# Patient Record
Sex: MALE | Race: WHITE | Employment: OTHER | URBAN - METROPOLITAN AREA
[De-identification: names, ages, dates, MRNs, and addresses within clinical notes are randomized per-mention and may not be internally consistent; named-entity substitution may affect disease eponyms.]

---

## 2024-11-18 ENCOUNTER — APPOINTMENT (OUTPATIENT)
Dept: GENERAL RADIOLOGY | Facility: HOSPITAL | Age: 59
End: 2024-11-18
Attending: EMERGENCY MEDICINE
Payer: COMMERCIAL

## 2024-11-18 ENCOUNTER — APPOINTMENT (OUTPATIENT)
Dept: CV DIAGNOSTICS | Facility: HOSPITAL | Age: 59
End: 2024-11-18
Attending: INTERNAL MEDICINE
Payer: COMMERCIAL

## 2024-11-18 ENCOUNTER — HOSPITAL ENCOUNTER (OUTPATIENT)
Facility: HOSPITAL | Age: 59
Setting detail: OBSERVATION
Discharge: HOME | End: 2024-11-18
Attending: EMERGENCY MEDICINE | Admitting: HOSPITALIST
Payer: COMMERCIAL

## 2024-11-18 VITALS
HEIGHT: 74.8 IN | HEART RATE: 56 BPM | WEIGHT: 249.13 LBS | SYSTOLIC BLOOD PRESSURE: 132 MMHG | OXYGEN SATURATION: 97 % | RESPIRATION RATE: 15 BRPM | TEMPERATURE: 98 F | DIASTOLIC BLOOD PRESSURE: 93 MMHG | BODY MASS INDEX: 31.3 KG/M2

## 2024-11-18 DIAGNOSIS — R07.9 ACUTE CHEST PAIN: Primary | ICD-10-CM

## 2024-11-18 LAB
ALBUMIN SERPL-MCNC: 4.3 G/DL (ref 3.2–4.8)
ALBUMIN/GLOB SERPL: 1.7 {RATIO} (ref 1–2)
ALP LIVER SERPL-CCNC: 74 U/L
ALT SERPL-CCNC: 65 U/L
ANION GAP SERPL CALC-SCNC: 4 MMOL/L (ref 0–18)
AST SERPL-CCNC: 45 U/L (ref ?–34)
ATRIAL RATE: 59 BPM
ATRIAL RATE: 70 BPM
BASOPHILS # BLD AUTO: 0.03 X10(3) UL (ref 0–0.2)
BASOPHILS NFR BLD AUTO: 0.4 %
BILIRUB SERPL-MCNC: 0.5 MG/DL (ref 0.2–1.1)
BUN BLD-MCNC: 15 MG/DL (ref 9–23)
CALCIUM BLD-MCNC: 9.4 MG/DL (ref 8.7–10.4)
CHLORIDE SERPL-SCNC: 104 MMOL/L (ref 98–112)
CO2 SERPL-SCNC: 30 MMOL/L (ref 21–32)
CREAT BLD-MCNC: 1 MG/DL
D DIMER PPP FEU-MCNC: <0.27 UG/ML FEU (ref ?–0.6)
EGFRCR SERPLBLD CKD-EPI 2021: 86 ML/MIN/1.73M2 (ref 60–?)
EOSINOPHIL # BLD AUTO: 0.1 X10(3) UL (ref 0–0.7)
EOSINOPHIL NFR BLD AUTO: 1.4 %
ERYTHROCYTE [DISTWIDTH] IN BLOOD BY AUTOMATED COUNT: 12.1 %
GLOBULIN PLAS-MCNC: 2.6 G/DL (ref 2–3.5)
GLUCOSE BLD-MCNC: 147 MG/DL (ref 70–99)
HCT VFR BLD AUTO: 44.8 %
HGB BLD-MCNC: 15.4 G/DL
IMM GRANULOCYTES # BLD AUTO: 0.05 X10(3) UL (ref 0–1)
IMM GRANULOCYTES NFR BLD: 0.7 %
LYMPHOCYTES # BLD AUTO: 2.65 X10(3) UL (ref 1–4)
LYMPHOCYTES NFR BLD AUTO: 38 %
MCH RBC QN AUTO: 29.6 PG (ref 26–34)
MCHC RBC AUTO-ENTMCNC: 34.4 G/DL (ref 31–37)
MCV RBC AUTO: 86 FL
MONOCYTES # BLD AUTO: 0.6 X10(3) UL (ref 0.1–1)
MONOCYTES NFR BLD AUTO: 8.6 %
NEUTROPHILS # BLD AUTO: 3.55 X10 (3) UL (ref 1.5–7.7)
NEUTROPHILS # BLD AUTO: 3.55 X10(3) UL (ref 1.5–7.7)
NEUTROPHILS NFR BLD AUTO: 50.9 %
OSMOLALITY SERPL CALC.SUM OF ELEC: 290 MOSM/KG (ref 275–295)
P AXIS: 25 DEGREES
P AXIS: 30 DEGREES
P-R INTERVAL: 158 MS
P-R INTERVAL: 160 MS
PLATELET # BLD AUTO: 321 10(3)UL (ref 150–450)
POTASSIUM SERPL-SCNC: 4.3 MMOL/L (ref 3.5–5.1)
PROT SERPL-MCNC: 6.9 G/DL (ref 5.7–8.2)
Q-T INTERVAL: 428 MS
Q-T INTERVAL: 440 MS
QRS DURATION: 164 MS
QRS DURATION: 166 MS
QTC CALCULATION (BEZET): 435 MS
QTC CALCULATION (BEZET): 462 MS
R AXIS: 40 DEGREES
R AXIS: 49 DEGREES
RBC # BLD AUTO: 5.21 X10(6)UL
SODIUM SERPL-SCNC: 138 MMOL/L (ref 136–145)
T AXIS: 22 DEGREES
T AXIS: 27 DEGREES
TROPONIN I SERPL HS-MCNC: <3 NG/L
TROPONIN I SERPL HS-MCNC: <3 NG/L
VENTRICULAR RATE: 59 BPM
VENTRICULAR RATE: 70 BPM
WBC # BLD AUTO: 7 X10(3) UL (ref 4–11)

## 2024-11-18 PROCEDURE — 99235 HOSP IP/OBS SAME DATE MOD 70: CPT | Performed by: INTERNAL MEDICINE

## 2024-11-18 PROCEDURE — 93018 CV STRESS TEST I&R ONLY: CPT | Performed by: INTERNAL MEDICINE

## 2024-11-18 PROCEDURE — 93306 TTE W/DOPPLER COMPLETE: CPT | Performed by: INTERNAL MEDICINE

## 2024-11-18 PROCEDURE — 78452 HT MUSCLE IMAGE SPECT MULT: CPT | Performed by: INTERNAL MEDICINE

## 2024-11-18 PROCEDURE — 71045 X-RAY EXAM CHEST 1 VIEW: CPT | Performed by: EMERGENCY MEDICINE

## 2024-11-18 PROCEDURE — 93017 CV STRESS TEST TRACING ONLY: CPT | Performed by: INTERNAL MEDICINE

## 2024-11-18 RX ORDER — LOSARTAN POTASSIUM 100 MG/1
100 TABLET ORAL DAILY
Status: DISCONTINUED | OUTPATIENT
Start: 2024-11-19 | End: 2024-11-18

## 2024-11-18 RX ORDER — ONDANSETRON 2 MG/ML
4 INJECTION INTRAMUSCULAR; INTRAVENOUS EVERY 6 HOURS PRN
Status: DISCONTINUED | OUTPATIENT
Start: 2024-11-18 | End: 2024-11-18

## 2024-11-18 RX ORDER — SENNOSIDES 8.6 MG
17.2 TABLET ORAL NIGHTLY PRN
Status: DISCONTINUED | OUTPATIENT
Start: 2024-11-18 | End: 2024-11-18

## 2024-11-18 RX ORDER — HYDROCHLOROTHIAZIDE 12.5 MG/1
12.5 TABLET ORAL DAILY
Status: DISCONTINUED | OUTPATIENT
Start: 2024-11-19 | End: 2024-11-18

## 2024-11-18 RX ORDER — SODIUM PHOSPHATE, DIBASIC AND SODIUM PHOSPHATE, MONOBASIC 7; 19 G/230ML; G/230ML
1 ENEMA RECTAL ONCE AS NEEDED
Status: DISCONTINUED | OUTPATIENT
Start: 2024-11-18 | End: 2024-11-18

## 2024-11-18 RX ORDER — ASPIRIN 81 MG/1
81 TABLET, CHEWABLE ORAL DAILY
Status: DISCONTINUED | OUTPATIENT
Start: 2024-11-18 | End: 2024-11-18

## 2024-11-18 RX ORDER — ECHINACEA PURPUREA EXTRACT 125 MG
1 TABLET ORAL
Status: DISCONTINUED | OUTPATIENT
Start: 2024-11-18 | End: 2024-11-18

## 2024-11-18 RX ORDER — POLYETHYLENE GLYCOL 3350 17 G/17G
17 POWDER, FOR SOLUTION ORAL DAILY PRN
Status: DISCONTINUED | OUTPATIENT
Start: 2024-11-18 | End: 2024-11-18

## 2024-11-18 RX ORDER — ENOXAPARIN SODIUM 100 MG/ML
40 INJECTION SUBCUTANEOUS NIGHTLY
Status: DISCONTINUED | OUTPATIENT
Start: 2024-11-18 | End: 2024-11-18

## 2024-11-18 RX ORDER — PROCHLORPERAZINE EDISYLATE 5 MG/ML
5 INJECTION INTRAMUSCULAR; INTRAVENOUS EVERY 8 HOURS PRN
Status: DISCONTINUED | OUTPATIENT
Start: 2024-11-18 | End: 2024-11-18

## 2024-11-18 RX ORDER — BISACODYL 10 MG
10 SUPPOSITORY, RECTAL RECTAL
Status: DISCONTINUED | OUTPATIENT
Start: 2024-11-18 | End: 2024-11-18

## 2024-11-18 RX ORDER — ACETAMINOPHEN 500 MG
500 TABLET ORAL EVERY 4 HOURS PRN
Status: DISCONTINUED | OUTPATIENT
Start: 2024-11-18 | End: 2024-11-18

## 2024-11-18 RX ORDER — BENZONATATE 200 MG/1
200 CAPSULE ORAL 3 TIMES DAILY PRN
Status: DISCONTINUED | OUTPATIENT
Start: 2024-11-18 | End: 2024-11-18

## 2024-11-18 RX ORDER — AMLODIPINE BESYLATE 5 MG/1
5 TABLET ORAL DAILY
Status: DISCONTINUED | OUTPATIENT
Start: 2024-11-19 | End: 2024-11-18

## 2024-11-18 NOTE — DISCHARGE SUMMARY
[place] HOSPITALIST  DISCHARGE SUMMARY     Dedrick Willis Patient Status:  Observation    10/29/1965 MRN [id number]   Location [facility] 0SW-A Attending Doron Porras,    Hosp Day # 0 PCP Unknown Pcp     Date of Admission: 2024  Date of Discharge: 2024  Discharge Disposition: Home or Self Care    Discharge Diagnosis:   #Chest pain, resolved  #Hypertension  #Elevated LFTs, mild    History of Present Illness: Dedrick Willis is a 59 year old male with PMHx hypertension who presents to the hospital with chest pain and dizziness that started at 5AM. It was sharp and substernal. He felt diaphoretic and dizzy. No nausea, vomiting or SOB. He states it radiated down both his arms and legs as well as neck. He describes it as a burning sensation. It resolved after 5-10 minutes. In ER, EKG showed RBBB. He had another episode of pain in the ER lasting 2-3 minutes, EKG without change. Troponin x 2 and D-dimer negative. Cardiology consulted and plan for echo and stress test. He denies family hx of heart disease. He is a never smoker and drinks ETOH socially.     Brief Synopsis: Troponin and D-dimer were negative.  Echo showed preserved EF, no significant valvular abnormalities and normal diastolic function parameters.  Stress test negative for ischemia.  He was discharged with impression.       59-90 High Risk  29-58 Medium Risk  0-28   Low Risk  Patient was referred to the [facility].    TCM Follow-Up Recommendation:  LACE < 29: Low Risk of readmission after discharge from the hospital; Still recommend for TCM follow-up.    Procedures during hospitalization:   Nuclear stress test    Incidental or significant findings and recommendations (brief descriptions):  Elevated LFTs, recommend outpatient PCP follow-up or at [facility]    Lab/Test results pending at Discharge:   None     Consultants:  Cardiology    Discharge Medication List:     Discharge Medications        CONTINUE taking these  medications        Instructions Prescription details   UNKNOWN TO PATIENT - BLOOD PRESSURE       Refills: 0     UNKNOWN TO PATIENT - BLOOD THINNER       Refills: 0            STOP taking these medications      acetaminophen 325 MG Tabs  Commonly known as: Tylenol                 [place] substances prescribed at discharge.    Follow-up appointment:   [facility]  [address]  [facility] [address] [phone]  Go to      PCP    Follow up in 1 week(s)      Deondre Stringer MD  [address] [address] [phone]    Follow up  our office will call you for an appointment    Appointments for Next 30 Days 11/18/2024 - 12/18/2024      None            Vital signs:  Temp:  [97.6 °F (36.4 °C)] 97.6 °F (36.4 °C)  Pulse:  [56-79] 56  Resp:  [14-18] 15  BP: (131-160)/(87-94) 132/93  SpO2:  [95 %-100 %] 97 %    Physical Exam:    See H&P same day.  -----------------------------------------------------------------------------------------------  PATIENT DISCHARGE INSTRUCTIONS: See electronic chart    Doron Porras DO    Time spent:  15 minutes

## 2024-11-18 NOTE — CONSULTS
Cardiology Consultation    Dedrick Willis Patient Status:  Emergency    10/29/1965 MRN [id number]   [facility] EMERGENCY DEPARTMENT Attending Anibal Tellez MD   Hosp Day # 0 PCP Unknown Pcp     Reason for Consultation:  chest pain, dizziness      History of Present Illness:  Dedrick Willis is a a(n) 59 year old male. Very nice magdalena, here with his wife, German speaking.  Daughter on the phone translates.  He has HTN. .  He woke this morning feeling \"hung over.\"  He then experienced 5 minutes of chest pain with dizziness.  Sx's resolved on their own.  911 called.    /90 on arrival.  P 60's, on RA.  EKG with RBBB, NPT.  Had further cp in the ER, EKG w/o change.  Troponin negative.      History:  Past Medical History:    Essential hypertension     Past Surgical History:   Procedure Laterality Date    Appendectomy      Other  surgery, left ear    Repair ing hernia,5+y/o,reducibl       No family history on file.      Allergies:  Allergies[1]    Medications:      Continuous Infusions:      Social History:   reports that he has never smoked. He does not have any smokeless tobacco history on file. He reports that he does not currently use alcohol. He reports that he does not use drugs.    Review of Systems:  All systems were reviewed and are negative except as described above in HPI.    Physical Exam:      Temp:  [97.6 °F (36.4 °C)] 97.6 °F (36.4 °C)  Pulse:  [61-79] 63  Resp:  [14-18] 15  BP: (131-160)/(87-94) 159/94  SpO2:  [95 %-100 %] 98 %    Last 3 Weights   24 0541 250 lb (113.4 kg)   14 0909 195 lb (88.5 kg)           General: No apparent distress  HEENT: No focal deficits.  Neck: supple. JVP normal  Cardiac: Regular rhythm, S1, S2 normal,   No rub or gallop.  No murmur.  Lungs: CTA  Abdomen: Soft, non-tender.   Extremities: No edema  Neurologic: no focal deficits  Skin: Warm and dry.          Telemetry: sinus    Laboratories and Data:  Diagnostics:    EKG,  11/18/2024:  RBBB    CXR, 11/18/2024:  normal medistinum    Labs:   HEM:  Recent Labs   Lab 11/18/24  0545   WBC 7.0   HGB 15.4   .0       Chem:  Recent Labs   Lab 11/18/24  0545      K 4.3      CO2 30.0   BUN 15   CREATSERUM 1.00   CA 9.4   *       Recent Labs   Lab 11/18/24  0545   ALT 65*   AST 45*   ALB 4.3       No results for input(s): \"PTP\", \"INR\" in the last 168 hours.    No results for input(s): \"TROP\", \"CK\" in the last 168 hours.      Impression:   Chest pain with dizziness - unclear etiology at this time.  HTN  RBBB, age undetermined.    Plan:  2 hour troponin and d dimer.  Plan admit with nuclear stress and echocardiogram today.    Deondre Stringer MD  11/18/2024  8:49 AM  C5       [1] No Known Allergies

## 2024-11-18 NOTE — ED INITIAL ASSESSMENT (HPI)
Triage done with  Aureliano [id number]    Patient here via EMS with c/o pins and needle sensation in chest and dizziness.  Patient woke up with symptoms.  Patient given 324mg of aspirin.  Denies chest pain and dizziness at this time.

## 2024-11-18 NOTE — ED PROVIDER NOTES
Patient Seen in: [facility] Emergency Department      History     Chief Complaint   Patient presents with    Chest Pain Angina    Dizziness     Stated Complaint: Chest pain    Subjective:   HPI      59-year-old male presents reporting an episode of sharp pain in the midsternal region associated with dizziness and diaphoresis.  The episode lasted about 10 minutes and then resolved.  No similar episodes in the past.  Did not notice any significant shortness of breath or palpitations.  Reports a history of hypertension.  He also says he takes an unknown blood thinner.  Denies any significant cardiac history.    Objective:     Past Medical History:    Essential hypertension    High blood pressure    Visual impairment              Past Surgical History:   Procedure Laterality Date    Appendectomy      Other  surgery, left ear    Repair ing hernia,5+y/o,reducibl                  Social History     Socioeconomic History    Marital status:    Tobacco Use    Smoking status: Never   Vaping Use    Vaping status: Never Used   Substance and Sexual Activity    Alcohol use: Not Currently    Drug use: Never     Social Drivers of Health     Food Insecurity: No Food Insecurity (11/18/2024)    Food Insecurity     Food Insecurity: Never true   Transportation Needs: No Transportation Needs (11/18/2024)    Transportation Needs     Lack of Transportation: No   Housing Stability: Low Risk  (11/18/2024)    Housing Stability     Housing Instability: No                  Physical Exam     ED Triage Vitals [11/18/24 0541]   BP (!) 140/91   Pulse 77   Resp 14   Temp 97.6 °F (36.4 °C)   Temp src Temporal   SpO2 95 %   O2 Device None (Room air)       Current Vitals:   Vital Signs  BP: (!) 132/93  Pulse: 56  Resp: 15  Temp: 97.6 °F (36.4 °C)  Temp src: Temporal  MAP (mmHg): (!) 104    Oxygen Therapy  SpO2: 97 %  O2 Device: None (Room air)        Physical Exam  General:  Vitals as listed.  No acute distress   HEENT: Sclerae anicteric.   Conjunctivae show no pallor.  Oropharynx clear, mucous membranes moist   Neck: supple, no rigidity   Lungs: good air exchange and clear   Heart: regular rate rhythm and no murmur   Abdomen: Soft and nontender.  No abdominal masses.  No peritoneal signs   Extremities: no edema, normal peripheral pulses   Neuro: Alert oriented and nonfocal   Skin: no rashes or nodules    ED Course     Labs Reviewed   COMP METABOLIC PANEL (14) - Abnormal; Notable for the following components:       Result Value    Glucose 147 (*)     AST 45 (*)     ALT 65 (*)     All other components within normal limits   TROPONIN I HIGH SENSITIVITY - Normal   D-DIMER - Normal   TROPONIN I HIGH SENSITIVITY - Normal   CBC WITH DIFFERENTIAL WITH PLATELET   RAINBOW DRAW LAVENDER   RAINBOW DRAW LIGHT GREEN   RAINBOW DRAW BLUE     [id number]    Rate, intervals and axes as noted on EKG Report.  Rate: 59  Rhythm: Sinus Rhythm  Reading: No ST elevation MI.  No previous for comparison.    [id number]    Rate, intervals and axes as noted on EKG Report.  Rate: 70  Rhythm: Sinus Rhythm  Reading: No ST elevation MI                XR CHEST AP PORTABLE  (CPT=71045)    Result Date: 11/18/2024  PROCEDURE:  XR CHEST AP PORTABLE  (CPT=71045)  TECHNIQUE:  AP chest radiograph was obtained.  COMPARISON:  None.  INDICATIONS:  Chest pain  PATIENT STATED HISTORY: (As transcribed by Technologist)  Patient offered no additional history at this time.    FINDINGS:  The heart is borderline in size.  The lungs are clear of acute-appearing disease process.  The costophrenic angles are sharp.  There is no active disease seen on the basis of portable radiography.            CONCLUSION:  Borderline heart size. No active disease seen.   LOCATION:  [facility]      Dictated by (CST): Tushar Davis MD on 11/18/2024 at 6:43 AM     Finalized by (CST): Tushar Davis MD on 11/18/2024 at 6:43 AM          Heart Score:    HEART Score      Title      Criteria Score   Age: 45-64 Age Score: 1   History:  Slightly Suspicious Hx Score: 0     EKG: Non-Spec Changes EKG Score: 1   HTN: Yes   Hypercholesterolemia: No   Atherosclerosis/PVD: No     DM: No   BMI>30kg/m2: Yes   Smoking: No         Other Risk Factor Score: 2                     HEART Score: 3        Risk of adverse cardiac event is 0.9-1.7%               MDM      59-year-old male presents reporting 10 minutes of sharp pain in the midsternal region with diaphoresis and dizziness that occurred 1 hour ago.    Additional history obtained by patient's wife who reports that he currently takes edoxaban for anticoagulation.  This was [place] in [place]    Differential includes but is not limited to musculoskeletal pain, cardiac ischemia, pleurisy,, a life threat.    CBC, CMP, troponin, EKG, chest x-ray ordered for further evaluation.    My independent interpretation of chest x-ray is that there is no obvious large pleural effusion.    Laboratory evaluation returned with cardiac enzyme within normal limits.  Mild elevation of LFTs with AST 45, ALT 65.  No tenderness on palpation in the right upper quadrant.  He did have an additional episode of pain while here in the emergency room.  EKG at that time shows no acute ischemia.    Troponin negative x 2.  Discussed with admitting physician.  Patient evaluated by [facility] at the bedside who requests hospitalization for further evaluation.        Admission disposition: 11/18/2024  9:01 AM           Medical Decision Making      Disposition and Plan     Clinical Impression:  1. Acute chest pain         Disposition:  Admit  11/18/2024  9:01 am    Follow-up:  [facility]  [address] [place] [address] [address] [place] [address] [phone]  Go to      PCP    Follow up in 1 week(s)            Medications Prescribed:  Current Discharge Medication List              Supplementary Documentation:         Hospital Problems       Present on Admission             ICD-10-CM Noted POA    * (Principal) Acute chest pain R07.9  11/18/2024 Unknown

## 2024-11-18 NOTE — PROGRESS NOTES
CARDIODIAGNOSTIC PRELIMINARY REPORT:     LOGAN completed for 8:15, tolerated well    Second set of images pending

## 2024-11-18 NOTE — PROGRESS NOTES
NURSING DISCHARGE NOTE    Discharged Home via Ambulatory.  Accompanied by RN  Belongings Taken by patient/family.  PIV discontinued  AVS reviewed.

## 2024-11-18 NOTE — PLAN OF CARE
Echo:     Conclusions:     1. Left ventricle: The cavity size was normal. Wall thickness was normal.      Systolic function was normal. The estimated ejection fraction was 55-60%.      Left ventricular diastolic function parameters were normal for the      patient's age.   2. No significant valvular abnormalities.   Impressions:  No previous study from [facility] was   available for comparison.       Nuclear stress test negative for ischemia, EF 61%.     Troponin negative x 2    D dimer negative    Ok to discharge to home from a cardiology standpoint.   Follow up Dr. Stringer.   4:27 PM

## 2024-11-18 NOTE — PLAN OF CARE
NURSING ADMISSION NOTE      Patient admitted via Cart  Oriented to room.  Safety precautions initiated.  Bed in low position.  Call light in reach.  Assumed carte 1300  Alert and oriented x4.  RA  Tele0- NSR  Stress test and echo to be completed.   Q2 trop draw  Cardiac diet  No pain.   BM this am.  Ambulatory  Cotninue to monitor pt.

## 2024-11-18 NOTE — H&P
[facility]     Dedrick Willis Patient Status:  Emergency    10/29/1965 MRN [id number]   Location [facility] EMERGENCY DEPARTMENT Attending Anibal Tellez MD   Hosp Day # 0 PCP Unknown Pcp     Chief Complaint: Chest pain, dizziness    Subjective:    History of Present Illness:   Dedrick Willis is a 59 year old male with PMHx hypertension who presents to the hospital with chest pain and dizziness that started at 5AM. It was sharp and substernal. He felt diaphoretic and dizzy. No nausea, vomiting or SOB. He states it radiated down both his arms and legs as well as neck. He describes it as a burning sensation. It resolved after 5-10 minutes. In ER, EKG showed RBBB. He had another episode of pain in the ER lasting 2-3 minutes, EKG without change. Troponin x 2 and D-dimer negative. Cardiology consulted and plan for echo and stress test. He denies family hx of heart disease. He is a never smoker and drinks ETOH socially.    History/Other:    Past Medical History:  Past Medical History:    Essential hypertension    High blood pressure    Visual impairment     Past Surgical History:   Past Surgical History:   Procedure Laterality Date    Appendectomy      Other  surgery, left ear    Repair ing hernia,5+y/o,reducibl        Family History:   History reviewed. No pertinent family history.  Social History:    reports that he has never smoked. He does not have any smokeless tobacco history on file. He reports that he does not currently use alcohol. He reports that he does not use drugs.     Allergies: Allergies[1]    Medications:  Medications Ordered Prior to Encounter[2]    Review of Systems:   A comprehensive review of systems was completed.    Pertinent positives and negatives noted in the HPI.    Objective:   Physical Exam:    BP (!) 159/94   Pulse 63   Temp 97.6 °F (36.4 °C) (Temporal)   Resp 15   Ht 6' 2.8\" (1.9 m)   Wt 249 lb 1.9 oz (113 kg)   SpO2 98%   BMI 31.30 kg/m²    General: No acute distress, awake and alert  Respiratory: No rhonchi, no wheezes  Cardiovascular: S1, S2. Regular rate and rhythm  Abdomen: Soft, Non-tender, non-distended, positive bowel sounds  Neuro: No new focal deficits  Extremities: No edema    Results:    Labs:      Labs Last 24 Hours:  Recent Labs   Lab 11/18/24  0545   RBC 5.21   HGB 15.4   HCT 44.8   MCV 86.0   MCH 29.6   MCHC 34.4   RDW 12.1   NEPRELIM 3.55   WBC 7.0   .0     Recent Labs   Lab 11/18/24  0545   *   BUN 15   CREATSERUM 1.00   EGFRCR 86   CA 9.4   ALB 4.3      K 4.3      CO2 30.0   ALKPHO 74   AST 45*   ALT 65*   BILT 0.5   TP 6.9     No results found for: \"PT\", \"INR\"    Recent Labs   Lab 11/18/24  0545 11/18/24  0916   TROPHS <3 <3     No results for input(s): \"TROP\", \"PBNP\" in the last 168 hours.    No results for input(s): \"PCT\" in the last 168 hours.    Imaging: Imaging data reviewed in Epic.    Assessment & Plan:      #Chest pain  -Troponin negative x 2, D-dimer negative, EKG with age indeterminate RBBB  -Cardiology consult  -Echo and stress test today  -Reports he takes aspirin [facility] from [facility]. Baby ASA ordered    #Hypertension  -Takes medication from [facility]. Appears to be Valsartan 160 mg, amlodipine 5 mg and hydrochlorothiazide 12.5 mg. Continue on discharge    #Elevated LFTs, mild  -Etiology unclear at this time. Outpatient follow up  -If kept overnight, will repeat in AM      Plan of care discussed with patient, wife, RN.    Doron Porras,     Supplementary Documentation:     The 21st Century Cures Act makes medical notes like these available to patients in the interest of transparency. Please be advised this is a medical document. Medical documents are intended to carry relevant information, facts as evident, and the clinical opinion of the practitioner. The medical note is intended as peer to peer communication and may appear blunt or direct. It is written in medical language and may  contain abbreviations or verbiage that are unfamiliar.                   [1] No Known Allergies  [2]   No current facility-administered medications on file prior to encounter.     Current Outpatient Medications on File Prior to Encounter   Medication Sig Dispense Refill    UNKNOWN TO PATIENT - BLOOD PRESSURE       UNKNOWN TO PATIENT - BLOOD THINNER       acetaminophen (TYLENOL) 325 MG Oral Tab Take 650 mg by mouth every 6 (six) hours as needed for Pain. (Patient not taking: Reported on 11/18/2024)

## 2024-11-18 NOTE — ED QUICK NOTES
Orders for admission, patient is aware of plan and ready to go upstairs. Any questions, please call ED RN CRISTÓBAL at extension [phone].     Patient Covid vaccination status: Partially vaccinated     COVID Test Ordered in ED: None    COVID Suspicion at Admission: N/A    Running Infusions:  None    Mental Status/LOC at time of transport: A&OX4,     Other pertinent information:   CIWA score: N/A   NIH score:  N/A

## 2024-11-19 ENCOUNTER — PATIENT OUTREACH (OUTPATIENT)
Dept: CASE MANAGEMENT | Age: 59
End: 2024-11-19

## 2024-11-20 NOTE — PROGRESS NOTES
PAULINA s/w patient who requested that NC speak with his daughter Alam and then handed the phone to her. She states that the patient stated everything was good. PAULINA discussed TCC and she brought it up to the patient, who was in the background, but patient declined stating that he will follow up with cardiology. She did state that cardiology has not called to schedule an appt yet. PAULINA advised that if they do not call today, she should call tomorrow to schedule an appt. She agreed. She denied having any questions or concerns. PAULINA closing encounter.